# Patient Record
Sex: FEMALE | Race: WHITE | NOT HISPANIC OR LATINO | URBAN - METROPOLITAN AREA
[De-identification: names, ages, dates, MRNs, and addresses within clinical notes are randomized per-mention and may not be internally consistent; named-entity substitution may affect disease eponyms.]

---

## 2022-09-06 ENCOUNTER — WEB ENCOUNTER (OUTPATIENT)
Dept: URBAN - METROPOLITAN AREA CLINIC 42 | Facility: CLINIC | Age: 22
End: 2022-09-06

## 2022-09-09 ENCOUNTER — OFFICE VISIT (OUTPATIENT)
Dept: URBAN - METROPOLITAN AREA CLINIC 42 | Facility: CLINIC | Age: 22
End: 2022-09-09
Payer: COMMERCIAL

## 2022-09-09 ENCOUNTER — TELEPHONE ENCOUNTER (OUTPATIENT)
Dept: URBAN - METROPOLITAN AREA CLINIC 92 | Facility: CLINIC | Age: 22
End: 2022-09-09

## 2022-09-09 VITALS
TEMPERATURE: 97.1 F | HEART RATE: 89 BPM | WEIGHT: 146 LBS | SYSTOLIC BLOOD PRESSURE: 119 MMHG | HEIGHT: 64 IN | DIASTOLIC BLOOD PRESSURE: 79 MMHG | BODY MASS INDEX: 24.92 KG/M2 | RESPIRATION RATE: 20 BRPM

## 2022-09-09 DIAGNOSIS — K50.919 CROHN'S DISEASE WITH COMPLICATION, UNSPECIFIED GASTROINTESTINAL TRACT LOCATION: ICD-10-CM

## 2022-09-09 PROCEDURE — 99204 OFFICE O/P NEW MOD 45 MIN: CPT | Performed by: INTERNAL MEDICINE

## 2022-09-09 RX ORDER — USTEKINUMAB 90 MG/ML
AS DIRECTED INJECTION, SOLUTION SUBCUTANEOUS
Status: ACTIVE | COMMUNITY

## 2022-09-09 RX ORDER — USTEKINUMAB 90 MG/ML
AS DIRECTED INJECTION, SOLUTION SUBCUTANEOUS
Qty: 1 | Refills: 6 | OUTPATIENT
Start: 2022-09-09 | End: 2023-11-03

## 2022-09-09 NOTE — HPI-TODAY'S VISIT:
The patient is a 23 yo female with a history of Crohn's disease.  She is transitioning to an adult GI with a history.  She was diagnosed when she was 16 yo.  She was on methotrexate and prednisone.  She was started on Stelara for 4 years now with great control of her symptoms.  She is currently doing well without any GI or extra GI symptoms.

## 2023-03-10 ENCOUNTER — OFFICE VISIT (OUTPATIENT)
Dept: URBAN - METROPOLITAN AREA CLINIC 42 | Facility: CLINIC | Age: 23
End: 2023-03-10
Payer: COMMERCIAL

## 2023-03-10 VITALS
TEMPERATURE: 96.9 F | RESPIRATION RATE: 20 BRPM | BODY MASS INDEX: 24.92 KG/M2 | HEIGHT: 64 IN | DIASTOLIC BLOOD PRESSURE: 84 MMHG | HEART RATE: 98 BPM | WEIGHT: 146 LBS | SYSTOLIC BLOOD PRESSURE: 124 MMHG

## 2023-03-10 DIAGNOSIS — K50.919 CROHN'S DISEASE WITH COMPLICATION, UNSPECIFIED GASTROINTESTINAL TRACT LOCATION: ICD-10-CM

## 2023-03-10 PROBLEM — 34000006: Status: ACTIVE | Noted: 2022-09-09

## 2023-03-10 PROCEDURE — 99213 OFFICE O/P EST LOW 20 MIN: CPT | Performed by: INTERNAL MEDICINE

## 2023-03-10 RX ORDER — USTEKINUMAB 90 MG/ML
AS DIRECTED INJECTION, SOLUTION SUBCUTANEOUS
Qty: 1 | Refills: 6 | OUTPATIENT

## 2023-03-10 RX ORDER — USTEKINUMAB 90 MG/ML
AS DIRECTED INJECTION, SOLUTION SUBCUTANEOUS
Qty: 1 | Refills: 6 | Status: ACTIVE | COMMUNITY
Start: 2022-09-09 | End: 2023-11-03

## 2023-03-10 RX ORDER — ESCITALOPRAM OXALATE 10 MG/1
1 TABLET TABLET, FILM COATED ORAL ONCE A DAY
Status: ACTIVE | COMMUNITY

## 2023-03-10 NOTE — HPI-TODAY'S VISIT:
The patient is a 21 yo female with a history of Crohn's disease.  She is transitioning to an adult GI with a history.  She was diagnosed when she was 16 yo.  She was on methotrexate and prednisone.  She was started on Stelara for 4 years now with great control of her symptoms.  She is currently doing well without any GI or extra GI symptoms.

## 2023-03-11 LAB
A/G RATIO: 2
ALBUMIN: 4.7
ALKALINE PHOSPHATASE: 43
ALT (SGPT): 15
AST (SGOT): 15
BILIRUBIN, TOTAL: 0.4
BUN/CREATININE RATIO: 11
BUN: 10
C-REACTIVE PROTEIN, QUANT: 3
CALCIUM: 9.7
CARBON DIOXIDE, TOTAL: 21
CHLORIDE: 103
CREATININE: 0.87
EGFR: 97
FERRITIN, SERUM: 32
GLOBULIN, TOTAL: 2.3
GLUCOSE: 71
HEMATOCRIT: 41.5
HEMOGLOBIN: 14.1
MCH: 32.4
MCHC: 34
MCV: 95
NRBC: (no result)
PLATELETS: 358
POTASSIUM: 4.4
PROTEIN, TOTAL: 7
RBC: 4.35
RDW: 11.8
SODIUM: 138
WBC: 5.3

## 2023-03-16 ENCOUNTER — TELEPHONE ENCOUNTER (OUTPATIENT)
Dept: URBAN - METROPOLITAN AREA CLINIC 35 | Facility: CLINIC | Age: 23
End: 2023-03-16

## 2023-09-05 ENCOUNTER — TELEPHONE ENCOUNTER (OUTPATIENT)
Dept: URBAN - METROPOLITAN AREA CLINIC 42 | Facility: CLINIC | Age: 23
End: 2023-09-05

## 2023-09-07 ENCOUNTER — OFFICE VISIT (OUTPATIENT)
Dept: URBAN - METROPOLITAN AREA CLINIC 82 | Facility: CLINIC | Age: 23
End: 2023-09-07
Payer: COMMERCIAL

## 2023-09-07 VITALS
DIASTOLIC BLOOD PRESSURE: 73 MMHG | RESPIRATION RATE: 20 BRPM | HEIGHT: 64 IN | WEIGHT: 149 LBS | SYSTOLIC BLOOD PRESSURE: 117 MMHG | BODY MASS INDEX: 25.44 KG/M2 | TEMPERATURE: 98.3 F | HEART RATE: 96 BPM

## 2023-09-07 DIAGNOSIS — K50.919 CROHN'S DISEASE WITH COMPLICATION, UNSPECIFIED GASTROINTESTINAL TRACT LOCATION: ICD-10-CM

## 2023-09-07 PROCEDURE — 99213 OFFICE O/P EST LOW 20 MIN: CPT | Performed by: INTERNAL MEDICINE

## 2023-09-07 RX ORDER — USTEKINUMAB 90 MG/ML
AS DIRECTED INJECTION, SOLUTION SUBCUTANEOUS
Qty: 1 | Refills: 6 | Status: ACTIVE | COMMUNITY

## 2023-09-07 RX ORDER — UBIDECARENONE 200 MG
AS DIRECTED CAPSULE ORAL
Status: ACTIVE | COMMUNITY

## 2023-09-07 RX ORDER — ESCITALOPRAM OXALATE 10 MG/1
1 TABLET TABLET, FILM COATED ORAL ONCE A DAY
Status: ACTIVE | COMMUNITY

## 2023-09-07 RX ORDER — USTEKINUMAB 90 MG/ML
AS DIRECTED INJECTION, SOLUTION SUBCUTANEOUS
Qty: 1 | Refills: 6 | OUTPATIENT

## 2023-09-07 NOTE — HPI-TODAY'S VISIT:
The patient is a 24 yo female with a history of Crohn's disease.  She is transitioning to an adult GI with a history.  She was diagnosed when she was 16 yo.  She was on methotrexate and prednisone.  She was started on Stelara for 4 years now with great control of her symptoms.  She is currently doing well without any GI or extra GI symptoms.  Has been having a tough time transitioning from remote work to inperson work.

## 2023-09-08 LAB
A/G RATIO: 1.8
ALBUMIN: 4.6
ALKALINE PHOSPHATASE: 51
ALT (SGPT): 58
AST (SGOT): 28
BILIRUBIN, TOTAL: 0.5
BUN/CREATININE RATIO: (no result)
BUN: 8
C-REACTIVE PROTEIN, QUANT: 1.3
CALCIUM: 9.9
CARBON DIOXIDE, TOTAL: 26
CHLORIDE: 104
CREATININE: 0.84
EGFR: 101
FERRITIN, SERUM: 28
GLOBULIN, TOTAL: 2.6
GLUCOSE: 64
HEMATOCRIT: 40
HEMOGLOBIN: 14.1
MCH: 33.5
MCHC: 35.3
MCV: 95
MPV: 10
PLATELET COUNT: 327
POTASSIUM: 3.9
PROTEIN, TOTAL: 7.2
RDW: 11.6
RED BLOOD CELL COUNT: 4.21
SODIUM: 140
WHITE BLOOD CELL COUNT: 6.3

## 2023-09-11 ENCOUNTER — TELEPHONE ENCOUNTER (OUTPATIENT)
Dept: URBAN - METROPOLITAN AREA CLINIC 42 | Facility: CLINIC | Age: 23
End: 2023-09-11

## 2024-03-08 ENCOUNTER — OV EP (OUTPATIENT)
Dept: URBAN - METROPOLITAN AREA CLINIC 42 | Facility: CLINIC | Age: 24
End: 2024-03-08

## 2024-03-14 ENCOUNTER — LAB (OUTPATIENT)
Dept: URBAN - METROPOLITAN AREA CLINIC 82 | Facility: CLINIC | Age: 24
End: 2024-03-14

## 2024-03-14 ENCOUNTER — OV EP (OUTPATIENT)
Dept: URBAN - METROPOLITAN AREA CLINIC 82 | Facility: CLINIC | Age: 24
End: 2024-03-14
Payer: COMMERCIAL

## 2024-03-14 VITALS
BODY MASS INDEX: 24.41 KG/M2 | HEART RATE: 99 BPM | HEIGHT: 64 IN | WEIGHT: 143 LBS | RESPIRATION RATE: 20 BRPM | SYSTOLIC BLOOD PRESSURE: 113 MMHG | TEMPERATURE: 98.5 F | DIASTOLIC BLOOD PRESSURE: 75 MMHG

## 2024-03-14 DIAGNOSIS — R74.8 ELEVATED LIVER ENZYMES: ICD-10-CM

## 2024-03-14 DIAGNOSIS — K50.919 CROHN'S DISEASE WITH COMPLICATION, UNSPECIFIED GASTROINTESTINAL TRACT LOCATION: ICD-10-CM

## 2024-03-14 PROCEDURE — 99203 OFFICE O/P NEW LOW 30 MIN: CPT | Performed by: INTERNAL MEDICINE

## 2024-03-14 RX ORDER — UBIDECARENONE 200 MG
AS DIRECTED CAPSULE ORAL
Status: ACTIVE | COMMUNITY

## 2024-03-14 RX ORDER — USTEKINUMAB 90 MG/ML
AS DIRECTED INJECTION, SOLUTION SUBCUTANEOUS
Qty: 1 | Refills: 6 | Status: ACTIVE | COMMUNITY

## 2024-03-14 RX ORDER — USTEKINUMAB 90 MG/ML
AS DIRECTED INJECTION, SOLUTION SUBCUTANEOUS
Qty: 1 | Refills: 6
End: 2025-05-08

## 2024-03-14 RX ORDER — ESCITALOPRAM OXALATE 10 MG/1
1 TABLET TABLET, FILM COATED ORAL ONCE A DAY
Status: ACTIVE | COMMUNITY

## 2024-03-14 NOTE — HPI-TODAY'S VISIT:
The patient is a 22 yo female with a history of Crohn's disease.  She is transitioning to an adult GI with a history.  She was diagnosed when she was 16 yo.  She was on methotrexate and prednisone.  She was started on Stelara for 4 years now with great control of her symptoms.  She is currently doing well without any GI or extra GI symptoms.  Has been having a tough time transitioning from remote work to inperson work. 3/14/24 Currently doing well.  Completely asymptomatic. Just got .

## 2024-03-14 NOTE — PHYSICAL EXAM CHEST:
no lesions, no deformities, no traumatic injuries, no significant scars are present, chest wall non-tender, no masses present, breathing is unlabored without accessory muscle use,normal breath sounds
Other:/delusional, combative at [facility]

## 2024-03-15 LAB
A/G RATIO: 1.7
ALBUMIN: 4.3
ALKALINE PHOSPHATASE: 46
ALT (SGPT): 12
AST (SGOT): 13
BILIRUBIN, TOTAL: 0.7
BUN/CREATININE RATIO: (no result)
BUN: 9
C-REACTIVE PROTEIN, QUANT: 0.5
CALCIUM: 9.1
CARBON DIOXIDE, TOTAL: 27
CHLORIDE: 103
CREATININE: 0.75
EGFR: 115
FERRITIN, SERUM: 32
GLOBULIN, TOTAL: 2.6
GLUCOSE: 93
HEMATOCRIT: 40.7
HEMOGLOBIN: 13.9
MCH: 32.9
MCHC: 34.2
MCV: 96.2
MPV: 10.1
PLATELET COUNT: 314
POTASSIUM: 3.6
PROTEIN, TOTAL: 6.9
RDW: 11.8
RED BLOOD CELL COUNT: 4.23
SODIUM: 137
WHITE BLOOD CELL COUNT: 7.8